# Patient Record
Sex: FEMALE | Race: WHITE | NOT HISPANIC OR LATINO
[De-identification: names, ages, dates, MRNs, and addresses within clinical notes are randomized per-mention and may not be internally consistent; named-entity substitution may affect disease eponyms.]

---

## 2023-03-17 PROBLEM — Z00.00 ENCOUNTER FOR PREVENTIVE HEALTH EXAMINATION: Status: ACTIVE | Noted: 2023-03-17

## 2023-03-27 ENCOUNTER — NON-APPOINTMENT (OUTPATIENT)
Age: 38
End: 2023-03-27

## 2023-03-28 ENCOUNTER — APPOINTMENT (OUTPATIENT)
Dept: NEUROLOGY | Facility: CLINIC | Age: 38
End: 2023-03-28
Payer: OTHER GOVERNMENT

## 2023-03-28 ENCOUNTER — NON-APPOINTMENT (OUTPATIENT)
Age: 38
End: 2023-03-28

## 2023-03-28 ENCOUNTER — TRANSCRIPTION ENCOUNTER (OUTPATIENT)
Age: 38
End: 2023-03-28

## 2023-03-28 VITALS
WEIGHT: 120 LBS | TEMPERATURE: 97.8 F | HEART RATE: 80 BPM | HEIGHT: 63 IN | BODY MASS INDEX: 21.26 KG/M2 | SYSTOLIC BLOOD PRESSURE: 117 MMHG | DIASTOLIC BLOOD PRESSURE: 78 MMHG

## 2023-03-28 DIAGNOSIS — Z82.49 FAMILY HISTORY OF ISCHEMIC HEART DISEASE AND OTHER DISEASES OF THE CIRCULATORY SYSTEM: ICD-10-CM

## 2023-03-28 DIAGNOSIS — F98.8 OTHER SPECIFIED BEHAVIORAL AND EMOTIONAL DISORDERS WITH ONSET USUALLY OCCURRING IN CHILDHOOD AND ADOLESCENCE: ICD-10-CM

## 2023-03-28 PROCEDURE — 99204 OFFICE O/P NEW MOD 45 MIN: CPT

## 2023-03-28 RX ORDER — MULTIVITAMIN
TABLET ORAL
Refills: 0 | Status: ACTIVE | COMMUNITY

## 2023-03-28 RX ORDER — NORETHINDRONE ACETATE AND ETHINYL ESTRADIOL AND FERROUS FUMARATE 1MG-20(21)
1-20 KIT ORAL
Refills: 0 | Status: ACTIVE | COMMUNITY

## 2023-03-28 NOTE — HISTORY OF PRESENT ILLNESS
[FreeTextEntry1] : 61-year-old woman without significant medical problems here for evaluations of complaints she is always had a becoming more difficult to manage.  Describes some stomach disease.  Active, always very busy, a lot of things in her mind, finds it difficult to maintain attention, gets easily distracted, takes a lot longer to finish any projects or activities due to procrastination, but difficulty retaining the information.  Has done well in school in the past, although did take more work but otherwise required.  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  And is having more trouble staying on top of everything that she needs to do.\par No significant past medical or psychiatric history.  No history of heart disease, lung disease, no history of anxiety or depression, never hospitalized.  On no medications.\par A couple months ago seen by her primary care physician, here for physical, EKG, blood work, everything was within normal.\par No history of blackouts, headaches, seizure disorders, no significant history of head injury or trauma.\par

## 2023-03-28 NOTE — DISCUSSION/SUMMARY
[FreeTextEntry1] : 37-year-old woman right-handed without significant medical problems, complaining of difficulty maintaining attention, easily distracted, different directions, symptoms consistent with attention deficit disorder control.  Nonfocal examination.\par Reviewed and discussed treatment options.\par Plan: We will do a trial with Vyvanse 1 tablet p.o. daily 15-day supply.\par Advised patient to give me a call back with an update on how she is doing with the medication 2 weeks.  We will then clarify continuation of the medication or any change in the dose as needed.\par Discussed possible side effects.\par Return to office, 3 months.

## 2023-03-28 NOTE — PHYSICAL EXAM
[General Appearance - Alert] : alert [General Appearance - In No Acute Distress] : in no acute distress [Oriented To Time, Place, And Person] : oriented to person, place, and time [Impaired Insight] : insight and judgment were intact [Affect] : the affect was normal [Person] : oriented to person [Place] : oriented to place [Time] : oriented to time [Concentration Intact] : normal concentrating ability [Visual Intact] : visual attention was ~T not ~L decreased [Naming Objects] : no difficulty naming common objects [Repeating Phrases] : no difficulty repeating a phrase [Writing A Sentence] : no difficulty writing a sentence [Fluency] : fluency intact [Comprehension] : comprehension intact [Reading] : reading intact [Past History] : adequate knowledge of personal past history [Cranial Nerves Optic (II)] : visual acuity intact bilaterally,  visual fields full to confrontation, pupils equal round and reactive to light [Cranial Nerves Oculomotor (III)] : extraocular motion intact [Cranial Nerves Trigeminal (V)] : facial sensation intact symmetrically [Cranial Nerves Facial (VII)] : face symmetrical [Cranial Nerves Vestibulocochlear (VIII)] : hearing was intact bilaterally [Cranial Nerves Glossopharyngeal (IX)] : tongue and palate midline [Cranial Nerves Accessory (XI - Cranial And Spinal)] : head turning and shoulder shrug symmetric [Cranial Nerves Hypoglossal (XII)] : there was no tongue deviation with protrusion [Motor Tone] : muscle tone was normal in all four extremities [Motor Strength] : muscle strength was normal in all four extremities [No Muscle Atrophy] : normal bulk in all four extremities [Sensation Tactile Decrease] : light touch was intact [Abnormal Walk] : normal gait [Balance] : balance was intact [2+] : Ankle jerk left 2+ [Past-pointing] : there was no past-pointing [Tremor] : no tremor present [Plantar Reflex Right Only] : normal on the right [Plantar Reflex Left Only] : normal on the left yes

## 2023-04-18 ENCOUNTER — NON-APPOINTMENT (OUTPATIENT)
Age: 38
End: 2023-04-18

## 2023-04-18 RX ORDER — DEXTROAMPHETAMINE SACCHARATE, AMPHETAMINE ASPARTATE MONOHYDRATE, DEXTROAMPHETAMINE SULFATE AND AMPHETAMINE SULFATE 5; 5; 5; 5 MG/1; MG/1; MG/1; MG/1
20 CAPSULE, EXTENDED RELEASE ORAL
Qty: 30 | Refills: 0 | Status: DISCONTINUED | COMMUNITY
Start: 2023-04-12 | End: 2023-04-18

## 2023-05-05 RX ORDER — DEXTROAMPHETAMINE SACCHARATE, AMPHETAMINE ASPARTATE MONOHYDRATE, DEXTROAMPHETAMINE SULFATE AND AMPHETAMINE SULFATE 1.25; 1.25; 1.25; 1.25 MG/1; MG/1; MG/1; MG/1
5 CAPSULE, EXTENDED RELEASE ORAL DAILY
Qty: 30 | Refills: 0 | Status: ACTIVE | COMMUNITY
Start: 2023-04-18 | End: 1900-01-01

## 2023-08-22 ENCOUNTER — APPOINTMENT (OUTPATIENT)
Dept: NEUROLOGY | Facility: CLINIC | Age: 38
End: 2023-08-22
Payer: OTHER GOVERNMENT

## 2023-08-22 VITALS
BODY MASS INDEX: 21.26 KG/M2 | HEART RATE: 69 BPM | TEMPERATURE: 98.2 F | HEIGHT: 63 IN | SYSTOLIC BLOOD PRESSURE: 117 MMHG | WEIGHT: 120 LBS | DIASTOLIC BLOOD PRESSURE: 79 MMHG

## 2023-08-22 PROCEDURE — 99214 OFFICE O/P EST MOD 30 MIN: CPT

## 2023-08-22 RX ORDER — LISDEXAMFETAMINE DIMESYLATE 20 MG/1
20 CAPSULE ORAL
Qty: 15 | Refills: 0 | Status: ACTIVE | COMMUNITY
Start: 2023-03-28 | End: 1900-01-01

## 2023-08-22 NOTE — DISCUSSION/SUMMARY
[FreeTextEntry1] : 38-year-old woman with attention deficit disorder, since last seen we have tried Vyvanse 10 mg, which is tolerable but she did not see much of a difference in attention or ability to concentrate. We tried Adderall XR 20 mg, then the 10 now the 5.  Which she said helpful but made her feel anxious. Reviewed and discussed treatment options. Could try Focalin or try increasing the Vyvanse. Plan: Restart Vyvanse at 20 mg p.o. daily 15-day supply. Patient will give a call back in 2 weeks with an update on how it works.  Can either stay at this dose or try 30 mg. Other options would be Focalin. Return to office, 4 to 6 months.

## 2023-08-22 NOTE — HISTORY OF PRESENT ILLNESS
[FreeTextEntry1] : 38-year-old woman right-handed history of attention deficit disorder here for follow-up visit.  Since last seen we have tried Vyvanse 10 mg once a day, which she sent was tolerable but did not see much of a difference.  Related tried Adderall Exar 20 mg, then brought it down to 5 mg, which she said made her very edgy.  So she did not like it. Would like to try something else.  No new illnesses, no recent hospitalizations.  No history of heart disease.

## 2024-02-22 ENCOUNTER — APPOINTMENT (OUTPATIENT)
Dept: NEUROLOGY | Facility: CLINIC | Age: 39
End: 2024-02-22

## 2024-04-16 ENCOUNTER — NON-APPOINTMENT (OUTPATIENT)
Age: 39
End: 2024-04-16

## 2024-04-22 ENCOUNTER — NON-APPOINTMENT (OUTPATIENT)
Age: 39
End: 2024-04-22

## 2025-02-23 ENCOUNTER — NON-APPOINTMENT (OUTPATIENT)
Age: 40
End: 2025-02-23

## 2025-05-02 ENCOUNTER — APPOINTMENT (OUTPATIENT)
Dept: GASTROENTEROLOGY | Facility: CLINIC | Age: 40
End: 2025-05-02

## 2025-08-26 ENCOUNTER — NON-APPOINTMENT (OUTPATIENT)
Age: 40
End: 2025-08-26

## 2025-08-28 ENCOUNTER — APPOINTMENT (OUTPATIENT)
Dept: GASTROENTEROLOGY | Facility: CLINIC | Age: 40
End: 2025-08-28

## 2025-08-28 VITALS
DIASTOLIC BLOOD PRESSURE: 74 MMHG | HEIGHT: 63 IN | SYSTOLIC BLOOD PRESSURE: 112 MMHG | BODY MASS INDEX: 22.68 KG/M2 | WEIGHT: 128 LBS

## 2025-08-28 DIAGNOSIS — R14.0 ABDOMINAL DISTENSION (GASEOUS): ICD-10-CM

## 2025-08-28 DIAGNOSIS — K59.09 OTHER CONSTIPATION: ICD-10-CM

## 2025-09-08 ENCOUNTER — TRANSCRIPTION ENCOUNTER (OUTPATIENT)
Age: 40
End: 2025-09-08

## 2025-09-09 ENCOUNTER — TRANSCRIPTION ENCOUNTER (OUTPATIENT)
Age: 40
End: 2025-09-09

## 2025-09-09 RX ORDER — LINACLOTIDE 145 UG/1
145 CAPSULE, GELATIN COATED ORAL
Qty: 30 | Refills: 1 | Status: ACTIVE | COMMUNITY
Start: 2025-09-09